# Patient Record
Sex: MALE | Race: OTHER | HISPANIC OR LATINO | Employment: STUDENT | URBAN - METROPOLITAN AREA
[De-identification: names, ages, dates, MRNs, and addresses within clinical notes are randomized per-mention and may not be internally consistent; named-entity substitution may affect disease eponyms.]

---
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
## 2024-02-07 ENCOUNTER — OFFICE VISIT (OUTPATIENT)
Dept: DERMATOLOGY | Facility: CLINIC | Age: 19
End: 2024-02-07
Payer: COMMERCIAL

## 2024-02-07 DIAGNOSIS — L65.9 ALOPECIA: Primary | ICD-10-CM

## 2024-02-07 PROCEDURE — 99203 OFFICE O/P NEW LOW 30 MIN: CPT | Performed by: STUDENT IN AN ORGANIZED HEALTH CARE EDUCATION/TRAINING PROGRAM

## 2024-02-07 PROCEDURE — 1036F TOBACCO NON-USER: CPT | Performed by: STUDENT IN AN ORGANIZED HEALTH CARE EDUCATION/TRAINING PROGRAM

## 2024-02-07 RX ORDER — MINOXIDIL 2.5 MG/1
2.5 TABLET ORAL DAILY
Qty: 30 TABLET | Refills: 5 | Status: SHIPPED | OUTPATIENT
Start: 2024-02-07 | End: 2025-02-06

## 2024-02-07 NOTE — PATIENT INSTRUCTIONS
Finasteride is moderately effective   It can give reverse ejaculation or depression in a minority of cases and can lower your sex drive in a minority of cases  This would be rare    Minoxidil comes as a foam called Rogaine. Its mildly effective. If you want to use it try the 5% foam twice daily to affected areas  Can cause headaches/ lower your blood pressure and most commonly cause skin redness and irritation at the area    The mots effective thing is minoxidil pill   That can definitely lower blood pressure and cause headaches to a higher rate but still overall very well tolerated  It has other very rare and potentially serious side effects like fluid build up around your heart (that very rarely happens)    Any of those medication you will need to take for as long as you want your hair to look thicker

## 2024-04-03 ENCOUNTER — OFFICE VISIT (OUTPATIENT)
Dept: DERMATOLOGY | Facility: CLINIC | Age: 19
End: 2024-04-03
Payer: COMMERCIAL

## 2024-04-03 DIAGNOSIS — L64.9 ANDROGENIC ALOPECIA: Primary | ICD-10-CM

## 2024-04-03 PROCEDURE — 99214 OFFICE O/P EST MOD 30 MIN: CPT | Performed by: STUDENT IN AN ORGANIZED HEALTH CARE EDUCATION/TRAINING PROGRAM

## 2024-04-03 RX ORDER — FINASTERIDE 1 MG/1
1 TABLET, FILM COATED ORAL DAILY
Qty: 90 TABLET | Refills: 3 | Status: SHIPPED | OUTPATIENT
Start: 2024-04-03 | End: 2025-04-03

## 2024-04-03 NOTE — PROGRESS NOTES
Subjective     Jon Mccall is a 18 y.o. male who presents for the following: Alopecia (To frontal scalp. Denies itch. Seen by Dr Andrade in Feb for condition, he Rx'd Minoxidil 2.5mg PO tabs, start with half tab then increase to one as tolerated. Patient states he decided not to take med, and has been using Minoxidil foam instead. Here today to discuss other treatment options. ).     Review of Systems:  No other skin or systemic complaints other than what is documented elsewhere in the note.    The following portions of the chart were reviewed this encounter and updated as appropriate:          Skin Cancer History  No skin cancer on file.      Specialty Problems          Dermatology Problems    Nail abnormality        Objective   Well appearing patient in no apparent distress; mood and affect are within normal limits.    A focused skin examination was performed. All findings within normal limits unless otherwise noted below.    Assessment/Plan   1. Androgenic alopecia  Scalp  Recession of frontal and temporal scalp, miniature hairs    The nature of androgenic alopecia was discussed with the patient. Advised this is a chronic and progressive condition due to the effect of sex hormones on the scalp. It is a genetic condition, however a family history of not required for a diagnosis. It can affect the hairline or the crown of the scalp with a slow, progressive thinning of the scalp.     Continue topical minoxidil 5% foam bid to affected area on scalp. Takes months to see results  Discussed Propecia, including risk of sexual sides effects and depression. Start 1 mg daily.   FU 9 months    finasteride (Propecia) 1 mg tablet - Scalp  Take 1 tablet (1 mg) by mouth once daily. Do not crush, chew, or split.    Related Procedures  Follow Up In Dermatology - Established Patient

## 2024-08-21 PROCEDURE — RXMED WILLOW AMBULATORY MEDICATION CHARGE

## 2024-08-24 ENCOUNTER — PHARMACY VISIT (OUTPATIENT)
Dept: PHARMACY | Facility: CLINIC | Age: 19
End: 2024-08-24
Payer: COMMERCIAL

## 2024-11-16 PROCEDURE — RXMED WILLOW AMBULATORY MEDICATION CHARGE

## 2024-11-22 ENCOUNTER — LAB (OUTPATIENT)
Dept: LAB | Facility: LAB | Age: 19
End: 2024-11-22
Payer: COMMERCIAL

## 2024-11-22 ENCOUNTER — APPOINTMENT (OUTPATIENT)
Dept: PRIMARY CARE | Facility: CLINIC | Age: 19
End: 2024-11-22
Payer: COMMERCIAL

## 2024-11-22 ENCOUNTER — PHARMACY VISIT (OUTPATIENT)
Dept: PHARMACY | Facility: CLINIC | Age: 19
End: 2024-11-22
Payer: COMMERCIAL

## 2024-11-22 VITALS
HEART RATE: 108 BPM | HEIGHT: 66 IN | WEIGHT: 152.8 LBS | SYSTOLIC BLOOD PRESSURE: 129 MMHG | TEMPERATURE: 98.6 F | DIASTOLIC BLOOD PRESSURE: 77 MMHG | BODY MASS INDEX: 24.56 KG/M2 | OXYGEN SATURATION: 97 %

## 2024-11-22 DIAGNOSIS — E78.6 LOW HDL (UNDER 40): ICD-10-CM

## 2024-11-22 DIAGNOSIS — R00.0 TACHYCARDIA: ICD-10-CM

## 2024-11-22 DIAGNOSIS — Z00.00 HEALTH CARE MAINTENANCE: Primary | ICD-10-CM

## 2024-11-22 DIAGNOSIS — Z23 IMMUNIZATION DUE: ICD-10-CM

## 2024-11-22 LAB
ALBUMIN SERPL BCP-MCNC: 5.3 G/DL (ref 3.4–5)
ALP SERPL-CCNC: 90 U/L (ref 33–120)
ALT SERPL W P-5'-P-CCNC: 12 U/L (ref 10–52)
ANION GAP SERPL CALC-SCNC: 13 MMOL/L (ref 10–20)
AST SERPL W P-5'-P-CCNC: 16 U/L (ref 9–39)
BILIRUB SERPL-MCNC: 0.7 MG/DL (ref 0–1.2)
BUN SERPL-MCNC: 15 MG/DL (ref 6–23)
CALCIUM SERPL-MCNC: 10.3 MG/DL (ref 8.6–10.6)
CHLORIDE SERPL-SCNC: 101 MMOL/L (ref 98–107)
CHOLEST SERPL-MCNC: 153 MG/DL (ref 0–199)
CHOLESTEROL/HDL RATIO: 3.2
CO2 SERPL-SCNC: 30 MMOL/L (ref 21–32)
CREAT SERPL-MCNC: 1.02 MG/DL (ref 0.5–1.3)
EGFRCR SERPLBLD CKD-EPI 2021: >90 ML/MIN/1.73M*2
GLUCOSE SERPL-MCNC: 85 MG/DL (ref 74–99)
HDLC SERPL-MCNC: 47.4 MG/DL
LDLC SERPL CALC-MCNC: 82 MG/DL
NON HDL CHOLESTEROL: 106 MG/DL (ref 0–119)
POTASSIUM SERPL-SCNC: 4.1 MMOL/L (ref 3.5–5.3)
PROT SERPL-MCNC: 7.7 G/DL (ref 6.4–8.2)
SODIUM SERPL-SCNC: 140 MMOL/L (ref 136–145)
TRIGL SERPL-MCNC: 120 MG/DL (ref 0–114)
TSH SERPL-ACNC: 1.05 MIU/L (ref 0.44–3.98)
VLDL: 24 MG/DL (ref 0–40)

## 2024-11-22 PROCEDURE — 80061 LIPID PANEL: CPT

## 2024-11-22 PROCEDURE — 3008F BODY MASS INDEX DOCD: CPT

## 2024-11-22 PROCEDURE — 90471 IMMUNIZATION ADMIN: CPT

## 2024-11-22 PROCEDURE — 90656 IIV3 VACC NO PRSV 0.5 ML IM: CPT

## 2024-11-22 PROCEDURE — 84443 ASSAY THYROID STIM HORMONE: CPT

## 2024-11-22 PROCEDURE — 99395 PREV VISIT EST AGE 18-39: CPT

## 2024-11-22 PROCEDURE — 80053 COMPREHEN METABOLIC PANEL: CPT

## 2024-11-22 PROCEDURE — 1036F TOBACCO NON-USER: CPT

## 2024-11-22 PROCEDURE — 36415 COLL VENOUS BLD VENIPUNCTURE: CPT

## 2024-11-22 ASSESSMENT — ENCOUNTER SYMPTOMS
OCCASIONAL FEELINGS OF UNSTEADINESS: 0
DEPRESSION: 0
LOSS OF SENSATION IN FEET: 0

## 2024-11-22 ASSESSMENT — COLUMBIA-SUICIDE SEVERITY RATING SCALE - C-SSRS
1. IN THE PAST MONTH, HAVE YOU WISHED YOU WERE DEAD OR WISHED YOU COULD GO TO SLEEP AND NOT WAKE UP?: NO
2. HAVE YOU ACTUALLY HAD ANY THOUGHTS OF KILLING YOURSELF?: NO
6. HAVE YOU EVER DONE ANYTHING, STARTED TO DO ANYTHING, OR PREPARED TO DO ANYTHING TO END YOUR LIFE?: NO

## 2024-11-22 ASSESSMENT — PATIENT HEALTH QUESTIONNAIRE - PHQ9
SUM OF ALL RESPONSES TO PHQ9 QUESTIONS 1 AND 2: 0
2. FEELING DOWN, DEPRESSED OR HOPELESS: NOT AT ALL
1. LITTLE INTEREST OR PLEASURE IN DOING THINGS: NOT AT ALL

## 2024-11-22 ASSESSMENT — PAIN SCALES - GENERAL: PAINLEVEL_OUTOF10: 0-NO PAIN

## 2024-11-22 NOTE — PROGRESS NOTES
"  Patient ID: Jon Mccall is a 19 y.o. male who presents for annual physical exam.     Concerns: none    # Health Maintenance  - Last prior HM: 11/14/23   - Patient's rating of their own health: Good  - Dental Care: last prior dental visit  every year  // brushes teeth // flosses teeth 1x/week // denies current tooth pain  - Vision: last prior ophtho visit 3 years ago, wear corrective glasses   - Hearing: denies recent hearing loss   - Diet: unhealthy \"teenage diet\" , does not like [employer] food  - Exercise: not able to exercise because too busy with school   - Weight: Gained 20 lb over a year   - Smoking: never a smoker  - EtOH: Alcohol Use: No, patient does not drink alcohol.  - Illicit substances: denied.  - Employment: student  - Living Situation: 4 roommate, in apartment  -family h/o colon ca? No  - Sexual hx: never been sexually active     Preventative:  -Last AAA U/S: n/a  -Last Colonoscopy (50-75): n/a  -Last LDCT (55-80): n/a  -Last Dental: recommended follow up  -Last Eye exam: recommended follow up  -Last DEXA: n/a  -Exercise:   -Diet:   -Seat Belt Use: always    Immunizations:  -Flu vaccine: due  -Pneuomvax (PPSV23): n/a  -Prevnar (PCV13): not indicated  -HPV: UTD  -Tdap: last 2015  -Shingrix(50+): not indicated    Screenings:  -Hep C (4741-7376): not sexually active  -HIV(15-65): not sexually active  -GC/CT: not sexually active   -Syphilis: not sexually active   -Lipid Panel (35M,45F): n/a  -DM screening: n/a  -HTN screening: BP wnl   -CAGE: neg  -PHQ-2: neg  - Reports occasional stress around exam time     REVIEW OF SYSTEMS:  -Organ systems reviewed & negative, except as mentioned in HPI    PMH, PSH, SoHx, FamHx and Medication reviewed and edited as indicated.     /77 (BP Location: Right arm, Patient Position: Sitting, BP Cuff Size: Adult)   Pulse 108   Temp 37 °C (98.6 °F) (Temporal)   Ht 1.676 m (5' 6\")   Wt 69.3 kg (152 lb 12.8 oz)   BMI 24.66 kg/m²     PHYSICAL EXAM:  -General:  Well " developed. Alert. No acute distress.  -Skin:  Warm, dry. normal skin turgor. no rashes. no lesions  -Cardiovascular:  tachycardic (102bpm) and regular rhythm, normal S1 and S2, no gallops, no murmurs and no pericardial rub.  -Pulmonary:  Clear bilateral breath sounds. no crackles, rales, rhonchi. good chest rise B/L. speaks in full sentences.  -Abdomen:  (+) BS. soft. non-tender. non-distended. no abdominal masses. no guarding or rigidity.  -Neurologic:  CN 2-12 grossly intact. Sensation equal b/l and grossly intact.  -Musculoskeletal:  Normal gait. Normal Stance. full range of motion in all extremities. Strength 5/5 in all extremities.  -Psychiatric:  Good judgment. Good insight. Alert and oriented x3 (place, person, time).    Assessment/Plan     19 year old M here for RHM. Patient overall doing well with no concerns. Noted academic/college work related stress. Reports sedentary lifestyle, occupied with colllege work, limited physical activity and sub-optimal diet. Noted tachycardia in the office, appears regular and asymptomatic. Denies caffeine/drug use. Likely related to stress (came to the office right after his physics exam) but will get labs to rule out thyroid/electrolyte abnormalities.     #RHM  - Flu vaccine: recommended annually,   - COVID vaccine: recommended completion of primary series and recommended boosters  - Tdap: next due 2025  - HPV (<45): completed  - STI screen: not high risk  - Lifetime HIV, syph, HepC: deferred d/t no prior sexual history  - HTN screening: wnl today  - Last Dental: recommended follow up every 6mo  - Last Eye exam: recommended follow up annually  - Discussed healthy lifestyle intervention  - Recommend coping strategies/relaxation techniques    Problem List Items Addressed This Visit    None  Visit Diagnoses         Codes    Health care maintenance    -  Primary Z00.00    Tachycardia     R00.0    Relevant Orders    Tsh With Reflex To Free T4 If Abnormal (Completed)     Comprehensive metabolic panel (Completed)    Follow Up In Primary Care    Immunization due     Z23    Relevant Orders    Flu vaccine, trivalent, preservative free, age 6 months and greater (Fluarix/Fluzone/Flulaval) (Completed)    Low HDL (under 40)     E78.6    Relevant Orders    Lipid panel (Completed)          RTC in 1 month to monitor heart rate when not acute stressed.        Discussed with attending, Dr. Jefferson Wilder MD  Family Medicine PGY3

## 2025-01-08 ENCOUNTER — APPOINTMENT (OUTPATIENT)
Dept: DERMATOLOGY | Facility: CLINIC | Age: 20
End: 2025-01-08
Payer: COMMERCIAL

## 2025-01-17 ENCOUNTER — APPOINTMENT (OUTPATIENT)
Dept: PRIMARY CARE | Facility: CLINIC | Age: 20
End: 2025-01-17
Payer: COMMERCIAL

## 2025-01-30 ENCOUNTER — APPOINTMENT (OUTPATIENT)
Dept: DERMATOLOGY | Facility: CLINIC | Age: 20
End: 2025-01-30
Payer: COMMERCIAL

## 2025-01-30 DIAGNOSIS — L64.9 ANDROGENIC ALOPECIA: Primary | ICD-10-CM

## 2025-01-30 PROCEDURE — 99213 OFFICE O/P EST LOW 20 MIN: CPT | Performed by: STUDENT IN AN ORGANIZED HEALTH CARE EDUCATION/TRAINING PROGRAM

## 2025-01-30 RX ORDER — FINASTERIDE 1 MG/1
1 TABLET, FILM COATED ORAL DAILY
Qty: 90 TABLET | Refills: 3 | Status: SHIPPED | OUTPATIENT
Start: 2025-01-30 | End: 2026-01-30

## 2025-01-30 NOTE — PROGRESS NOTES
Subjective     Jon Mccall is a 19 y.o. male who presents for the following: Alopecia (Follow up for alopecia. Currently treating with minoxidil 5% foam only once a day and Propecia. Patient has not noticed an increase in hair growth.).     Review of Systems:  No other skin or systemic complaints other than what is documented elsewhere in the note.    The following portions of the chart were reviewed this encounter and updated as appropriate:          Skin Cancer History  No skin cancer on file.      Specialty Problems          Dermatology Problems    Nail abnormality        Objective   Well appearing patient in no apparent distress; mood and affect are within normal limits.    A focused skin examination was performed. All findings within normal limits unless otherwise noted below.    Assessment/Plan   1. Androgenic alopecia  Scalp  Recession of frontal and temporal scalp, scattered hairs of 0.5-2 cm in length signifying regrowth  Still some miniaturized hairs          Reviewed the nature of androgenic alopecia was discussed with the patient. Advised this is a chronic and progressive condition due to the effect of sex hormones on the scalp. It is a genetic condition, however a family history of not required for a diagnosis. It can affect the hairline or the crown of the scalp with a slow, progressive thinning of the scalp.     Continue topical minoxidil 5% foam bid to affected area on scalp.   Continue Propecia  Consider supplements such as Viviscal, Nutrafol, or Untangled. Discussed these have data that show good outcomes but warned that studies were performed by the manufacturers.     Related Procedures  Follow Up In Dermatology - Established Patient  Follow Up In Dermatology - Established Patient    Related Medications  finasteride (Propecia) 1 mg tablet  Take 1 tablet (1 mg) by mouth once daily. Do not crush, chew, or split.

## 2025-03-20 PROCEDURE — RXMED WILLOW AMBULATORY MEDICATION CHARGE

## 2025-03-21 ENCOUNTER — PHARMACY VISIT (OUTPATIENT)
Dept: PHARMACY | Facility: CLINIC | Age: 20
End: 2025-03-21
Payer: COMMERCIAL

## 2025-03-28 ENCOUNTER — OFFICE VISIT (OUTPATIENT)
Facility: HOSPITAL | Age: 20
End: 2025-03-28
Payer: COMMERCIAL

## 2025-03-28 ENCOUNTER — APPOINTMENT (OUTPATIENT)
Facility: HOSPITAL | Age: 20
End: 2025-03-28
Payer: COMMERCIAL

## 2025-03-28 VITALS
DIASTOLIC BLOOD PRESSURE: 82 MMHG | SYSTOLIC BLOOD PRESSURE: 122 MMHG | HEIGHT: 66 IN | WEIGHT: 161 LBS | HEART RATE: 91 BPM | TEMPERATURE: 97 F | BODY MASS INDEX: 25.88 KG/M2 | OXYGEN SATURATION: 98 %

## 2025-03-28 DIAGNOSIS — R00.0 TACHYCARDIA: ICD-10-CM

## 2025-03-28 DIAGNOSIS — Z23 IMMUNIZATION DUE: ICD-10-CM

## 2025-03-28 PROCEDURE — 90715 TDAP VACCINE 7 YRS/> IM: CPT | Mod: GC

## 2025-03-28 PROCEDURE — 99213 OFFICE O/P EST LOW 20 MIN: CPT | Mod: GE,25

## 2025-03-28 ASSESSMENT — PAIN SCALES - GENERAL: PAINLEVEL_OUTOF10: 0-NO PAIN

## 2025-03-28 NOTE — PROGRESS NOTES
"Subjective   Patient ID: Jon Mccall is a 19 y.o. male who presents for Follow-up.    HPI     Last seen in November to establish care. Patient concerned about elevated HR at rest, but was noted to have regular rhythm and asymptomatic at the time. Labs unremarkable including CMP/TSH. Noted academic/college work related stress. Was recommended relaxation technique and stress management.     Here for follow up.     Gained 9lb since last visit  Stress manageable   Eating lot of cookies, still eating at dorm cafeteria   Not exercising much due to work load but looking forward to summer, planning to be more active physically  Sleeps 5-6 hrs and good appetite  Denies low mood and anxiety   Resting heart rate max 95, continues to remain asymptomatic  Not sexually active  Planning to apply for medical school next year     Requesting Tdap booster       Review of Systems  12 system ROS completed, negative except as noted above.      Objective   /82 (BP Location: Right arm, Patient Position: Sitting, BP Cuff Size: Adult)   Pulse 91   Temp 36.1 °C (97 °F) (Temporal)   Ht 1.676 m (5' 6\")   Wt 73 kg (161 lb)   SpO2 98%   BMI 25.99 kg/m²     Physical Exam    PHYSICAL EXAMINATION:   Gen: Appears well, NAD  Chest: CTA  CVS: regular without murmur or gallop  Abd: soft, NT/ND  Ext: no edema, nontender  Skin: good condition without wounds or lesions  Neuro: grossly normal, CN intact, CARA x 4  Mood and affect appropriate    Assessment/Plan     19 year old M here for follow up. Overall doing well, clinically stable.     Problem List Items Addressed This Visit    None  Visit Diagnoses         Codes    BMI 25.0-25.9,adult    -  Primary  - due to sedentary lifestyle and unhealthy eating   - discussed lifestyle intervention    Z68.25    Tachycardia      - resolved  - remains asymptomatic  - continue to have regular rate and rhythm    R00.0    Immunization due     Z23    Relevant Orders    Tdap vaccine, age 7 years and older  " (BOOSTRIX) (Completed)          RTC annually or sooner as needed.     Discussed with Dr. Zaki Wilder MD  Family Medicine PGY3

## 2025-03-28 NOTE — PROGRESS NOTES
"I reviewed the resident/fellow's documentation and discussed the patient with the resident/fellow. I agree with the resident/fellow's medical decision making as documented in the note. In future, consider \"[facility]: referral if more structure is desired    Imani Haines MD      "

## 2025-04-24 ENCOUNTER — APPOINTMENT (OUTPATIENT)
Dept: DERMATOLOGY | Facility: CLINIC | Age: 20
End: 2025-04-24
Payer: COMMERCIAL

## 2025-04-24 DIAGNOSIS — L64.9 ANDROGENIC ALOPECIA: Primary | ICD-10-CM

## 2025-04-24 PROCEDURE — 99213 OFFICE O/P EST LOW 20 MIN: CPT | Performed by: STUDENT IN AN ORGANIZED HEALTH CARE EDUCATION/TRAINING PROGRAM

## 2025-04-24 PROCEDURE — RXMED WILLOW AMBULATORY MEDICATION CHARGE

## 2025-04-24 RX ORDER — FINASTERIDE 1 MG/1
1 TABLET, FILM COATED ORAL DAILY
Qty: 90 TABLET | Refills: 3 | Status: SHIPPED | OUTPATIENT
Start: 2025-04-24 | End: 2026-04-24

## 2025-04-24 NOTE — Clinical Note
Reviewed the nature of androgenic alopecia was discussed with the patient. Advised this is a chronic and progressive condition due to the effect of sex hormones on the scalp. It is a genetic condition, however a family history of not required for a diagnosis. It can affect the hairline or the crown of the scalp with a slow, progressive thinning of the scalp.     increase topical minoxidil 5% foam to twice daily to affected area on scalp.   Continue Propecia, refill sent  Consider supplements such as Viviscal, Nutrafol, or Untangled. Discussed these have data that show good outcomes but warned that studies were performed by the manufacturers.

## 2025-04-24 NOTE — Clinical Note
Recession of frontal and temporal scalp, scattered hairs of 0.5-2 cm in length signifying regrowth  Still some miniaturized hairs

## 2025-04-24 NOTE — PROGRESS NOTES
Subjective     Jon Mccall is a 19 y.o. male who presents for the following: Alopecia (Follow up for androgenic alopecia. Currently treating with minoxidil 5% foam and Propecia 1 mg tablets. Patient has seen some hair growth on sides of scalp but is concerned that the top of scalp has been shedding more than normally.).     Review of Systems:  No other skin or systemic complaints other than what is documented elsewhere in the note.    The following portions of the chart were reviewed this encounter and updated as appropriate:          Skin Cancer History  Biopsy Log Book  No skin cancers from Specimen Tracking.    Additional History      Specialty Problems          Dermatology Problems    Nail abnormality        Objective   Well appearing patient in no apparent distress; mood and affect are within normal limits.    A focused skin examination was performed. All findings within normal limits unless otherwise noted below.    Assessment/Plan   Skin Exam  1. ANDROGENIC ALOPECIA  Scalp  Recession of frontal and temporal scalp, scattered hairs of 0.5-2 cm in length signifying regrowth  Still some miniaturized hairs    Reviewed the nature of androgenic alopecia was discussed with the patient. Advised this is a chronic and progressive condition due to the effect of sex hormones on the scalp. It is a genetic condition, however a family history of not required for a diagnosis. It can affect the hairline or the crown of the scalp with a slow, progressive thinning of the scalp.     Continue topical minoxidil 5% foam twice daily to affected area on scalp.   Continue Propecia, refill sent  Consider supplements such as Viviscal, Nutrafol, or Untangled. Discussed these have data that show good outcomes but warned that studies were performed by the manufacturers.   Related Procedures  Follow Up In Dermatology - Established Patient  Follow Up In Dermatology - Established Patient  Related Medications  finasteride (Propecia) 1 mg  tablet  Take 1 tablet (1 mg) by mouth once daily. Do not crush, chew, or split.    Follow up in 6 months    Cali Vaz MD   PGY4, Department of Dermatology    I saw and evaluated the patient. I personally obtained the key and critical portions of the history and physical exam or was physically present for key and critical portions performed by the resident. I reviewed the resident's documentation and discussed the patient with the resident. I agree with the resident's medical decision making as documented in the note.    Fatmata Wing MD

## 2025-04-29 ENCOUNTER — PHARMACY VISIT (OUTPATIENT)
Dept: PHARMACY | Facility: CLINIC | Age: 20
End: 2025-04-29
Payer: COMMERCIAL

## 2025-08-22 ENCOUNTER — APPOINTMENT (OUTPATIENT)
Facility: CLINIC | Age: 20
End: 2025-08-22
Payer: COMMERCIAL

## 2025-10-02 ENCOUNTER — APPOINTMENT (OUTPATIENT)
Dept: DERMATOLOGY | Facility: CLINIC | Age: 20
End: 2025-10-02
Payer: COMMERCIAL

## 2025-10-16 ENCOUNTER — APPOINTMENT (OUTPATIENT)
Dept: DERMATOLOGY | Facility: CLINIC | Age: 20
End: 2025-10-16
Payer: COMMERCIAL

## 2025-11-04 ENCOUNTER — APPOINTMENT (OUTPATIENT)
Dept: PRIMARY CARE | Facility: CLINIC | Age: 20
End: 2025-11-04
Payer: COMMERCIAL